# Patient Record
Sex: FEMALE | Race: BLACK OR AFRICAN AMERICAN | NOT HISPANIC OR LATINO | URBAN - METROPOLITAN AREA
[De-identification: names, ages, dates, MRNs, and addresses within clinical notes are randomized per-mention and may not be internally consistent; named-entity substitution may affect disease eponyms.]

---

## 2020-06-12 ENCOUNTER — CLAIMS CREATED FROM THE CLAIM WINDOW (OUTPATIENT)
Dept: URBAN - METROPOLITAN AREA CLINIC 4 | Facility: CLINIC | Age: 60
End: 2020-06-12
Payer: OTHER GOVERNMENT

## 2020-06-12 ENCOUNTER — OFFICE VISIT (OUTPATIENT)
Dept: URBAN - METROPOLITAN AREA SURGERY CENTER 16 | Facility: SURGERY CENTER | Age: 60
End: 2020-06-12
Payer: OTHER GOVERNMENT

## 2020-06-12 DIAGNOSIS — D12.3 ADENOMA OF TRANSVERSE COLON: ICD-10-CM

## 2020-06-12 DIAGNOSIS — D12.6 BENIGN NEOPLASM OF COLON, UNSPECIFIED: ICD-10-CM

## 2020-06-12 DIAGNOSIS — Z12.11 COLON CANCER SCREENING: ICD-10-CM

## 2020-06-12 DIAGNOSIS — D12.4 BENIGN NEOPLASM OF DESCENDING COLON: ICD-10-CM

## 2020-06-12 PROCEDURE — 45380 COLONOSCOPY AND BIOPSY: CPT | Performed by: INTERNAL MEDICINE

## 2020-06-12 PROCEDURE — G8907 PT DOC NO EVENTS ON DISCHARG: HCPCS | Performed by: INTERNAL MEDICINE

## 2020-06-12 PROCEDURE — 88305 TISSUE EXAM BY PATHOLOGIST: CPT | Performed by: PATHOLOGY

## 2020-06-12 PROCEDURE — G9933 CANC DETECTD DURING COL SCRN: HCPCS | Performed by: INTERNAL MEDICINE

## 2021-08-17 ENCOUNTER — OFFICE VISIT (OUTPATIENT)
Dept: URBAN - METROPOLITAN AREA CLINIC 92 | Facility: CLINIC | Age: 61
End: 2021-08-17
Payer: OTHER GOVERNMENT

## 2021-08-17 VITALS
HEIGHT: 61 IN | DIASTOLIC BLOOD PRESSURE: 103 MMHG | WEIGHT: 165 LBS | HEART RATE: 74 BPM | TEMPERATURE: 98 F | BODY MASS INDEX: 31.15 KG/M2 | SYSTOLIC BLOOD PRESSURE: 161 MMHG

## 2021-08-17 DIAGNOSIS — I10 HTN (HYPERTENSION) WITH GOAL TO BE DETERMINED: ICD-10-CM

## 2021-08-17 DIAGNOSIS — R19.5 HEME + STOOL: ICD-10-CM

## 2021-08-17 PROCEDURE — 99213 OFFICE O/P EST LOW 20 MIN: CPT | Performed by: INTERNAL MEDICINE

## 2021-08-17 NOTE — HPI-TODAY'S VISIT:
This is a 61 yo referred by Dr. Asiya Mckeon for heme positive stools.  A copy of this document will be sent to the referring provider. The patient denies BRBPR.  During a physical in February she was found to have heme positive stools on routine physical exam.  She had COVID  around that time which did not require a hospitalization.  During that time she became constipated but never saw blood in her stools.  Her stools went back to normal in May.  The stool test was repeated in April /May and was positive again. Labs were done however the labs are not available at this time.  A colonoscopy 2020 demonstrated a cecal tubular adenoma but was otherwise unremarkable.  She denies abdominal pain and denies NSAID use.

## 2021-08-18 LAB
HEMATOCRIT: 42
HEMOGLOBIN: 13.2
IRON: 42
MCH: 28.5
MCHC: 31.4
MCV: 91
NRBC: (no result)
PLATELETS: 266
RBC: 4.63
RDW: 12.5
WBC: 5.3

## 2021-10-15 ENCOUNTER — OFFICE VISIT (OUTPATIENT)
Dept: URBAN - METROPOLITAN AREA SURGERY CENTER 16 | Facility: SURGERY CENTER | Age: 61
End: 2021-10-15

## 2021-12-14 PROBLEM — 271840007 ABNORMAL FECES: Status: ACTIVE | Noted: 2021-08-17

## 2021-12-21 ENCOUNTER — CLAIMS CREATED FROM THE CLAIM WINDOW (OUTPATIENT)
Dept: URBAN - METROPOLITAN AREA CLINIC 4 | Facility: CLINIC | Age: 61
End: 2021-12-21
Payer: OTHER GOVERNMENT

## 2021-12-21 ENCOUNTER — OFFICE VISIT (OUTPATIENT)
Dept: URBAN - METROPOLITAN AREA SURGERY CENTER 16 | Facility: SURGERY CENTER | Age: 61
End: 2021-12-21
Payer: OTHER GOVERNMENT

## 2021-12-21 DIAGNOSIS — B96.81 HELICOBACTER PYLORI [H. PYLORI] AS THE CAUSE OF DISEASES CLASSIFIED ELSEWHERE: ICD-10-CM

## 2021-12-21 DIAGNOSIS — K31.7 POLYP OF STOMACH AND DUODENUM: ICD-10-CM

## 2021-12-21 DIAGNOSIS — K29.60 OTHER GASTRITIS WITHOUT BLEEDING: ICD-10-CM

## 2021-12-21 DIAGNOSIS — B96.81 BACTERIAL INFECTION DUE TO H. PYLORI: ICD-10-CM

## 2021-12-21 DIAGNOSIS — D13.1 ADENOMATOUS POLYP OF STOMACH: ICD-10-CM

## 2021-12-21 DIAGNOSIS — K29.60 ADENOPAPILLOMATOSIS GASTRICA: ICD-10-CM

## 2021-12-21 PROCEDURE — 88342 IMHCHEM/IMCYTCHM 1ST ANTB: CPT | Performed by: PATHOLOGY

## 2021-12-21 PROCEDURE — 88305 TISSUE EXAM BY PATHOLOGIST: CPT | Performed by: PATHOLOGY

## 2021-12-21 PROCEDURE — G8907 PT DOC NO EVENTS ON DISCHARG: HCPCS | Performed by: INTERNAL MEDICINE

## 2021-12-21 PROCEDURE — 43239 EGD BIOPSY SINGLE/MULTIPLE: CPT | Performed by: INTERNAL MEDICINE

## 2021-12-21 PROCEDURE — 88341 IMHCHEM/IMCYTCHM EA ADD ANTB: CPT | Performed by: PATHOLOGY

## 2021-12-28 ENCOUNTER — TELEPHONE ENCOUNTER (OUTPATIENT)
Dept: URBAN - METROPOLITAN AREA CLINIC 92 | Facility: CLINIC | Age: 61
End: 2021-12-28

## 2021-12-28 RX ORDER — LANSOPRAZOLE, AMOXICILLIN AND CLARITHROMYCIN 30-500-500
AS DIRECTED KIT ORAL BID
Refills: 0 | OUTPATIENT
Start: 2021-12-28

## 2021-12-28 RX ORDER — LANSOPRAZOLE, AMOXICILLIN AND CLARITHROMYCIN 30-500-500
AS DIRECTED KIT ORAL BID
Refills: 0
Start: 2021-12-28

## 2022-01-03 ENCOUNTER — TELEPHONE ENCOUNTER (OUTPATIENT)
Dept: URBAN - METROPOLITAN AREA CLINIC 17 | Facility: CLINIC | Age: 62
End: 2022-01-03

## 2022-01-05 ENCOUNTER — TELEPHONE ENCOUNTER (OUTPATIENT)
Dept: URBAN - METROPOLITAN AREA CLINIC 17 | Facility: CLINIC | Age: 62
End: 2022-01-05

## 2022-01-24 ENCOUNTER — TELEPHONE ENCOUNTER (OUTPATIENT)
Dept: URBAN - METROPOLITAN AREA CLINIC 17 | Facility: CLINIC | Age: 62
End: 2022-01-24

## 2022-01-24 RX ORDER — LANSOPRAZOLE, AMOXICILLIN AND CLARITHROMYCIN 30-500-500
AS DIRECTED KIT ORAL BID
Refills: 0 | Status: ACTIVE | COMMUNITY
Start: 2021-12-28

## 2022-01-24 RX ORDER — FLUCONAZOLE 150 MG/1
1 TABLET TABLET ORAL
Qty: 2 | Refills: 1 | OUTPATIENT
Start: 2022-01-24 | End: 2022-02-07

## 2022-02-15 ENCOUNTER — TELEPHONE ENCOUNTER (OUTPATIENT)
Dept: URBAN - METROPOLITAN AREA CLINIC 74 | Facility: CLINIC | Age: 62
End: 2022-02-15

## 2022-02-18 ENCOUNTER — OFFICE VISIT (OUTPATIENT)
Dept: URBAN - METROPOLITAN AREA MEDICAL CENTER 28 | Facility: MEDICAL CENTER | Age: 62
End: 2022-02-18

## 2023-07-09 PROBLEM — 429047008: Status: ACTIVE | Noted: 2023-07-09

## 2023-07-13 ENCOUNTER — LAB OUTSIDE AN ENCOUNTER (OUTPATIENT)
Dept: URBAN - METROPOLITAN AREA CLINIC 92 | Facility: CLINIC | Age: 63
End: 2023-07-13

## 2023-07-13 ENCOUNTER — WEB ENCOUNTER (OUTPATIENT)
Dept: URBAN - METROPOLITAN AREA CLINIC 92 | Facility: CLINIC | Age: 63
End: 2023-07-13

## 2023-07-13 ENCOUNTER — OFFICE VISIT (OUTPATIENT)
Dept: URBAN - METROPOLITAN AREA CLINIC 92 | Facility: CLINIC | Age: 63
End: 2023-07-13
Payer: OTHER GOVERNMENT

## 2023-07-13 ENCOUNTER — DASHBOARD ENCOUNTERS (OUTPATIENT)
Age: 63
End: 2023-07-13

## 2023-07-13 VITALS
WEIGHT: 161 LBS | DIASTOLIC BLOOD PRESSURE: 97 MMHG | SYSTOLIC BLOOD PRESSURE: 145 MMHG | HEART RATE: 66 BPM | BODY MASS INDEX: 30.4 KG/M2 | TEMPERATURE: 97.1 F | HEIGHT: 61 IN

## 2023-07-13 DIAGNOSIS — Z86.010 HISTORY OF ADENOMATOUS POLYP OF COLON: ICD-10-CM

## 2023-07-13 DIAGNOSIS — D13.1 GASTRIC ADENOMA: ICD-10-CM

## 2023-07-13 DIAGNOSIS — Z86.19 HISTORY OF HELICOBACTER PYLORI INFECTION: ICD-10-CM

## 2023-07-13 DIAGNOSIS — R10.10 UPPER ABDOMINAL PAIN: ICD-10-CM

## 2023-07-13 PROCEDURE — 99213 OFFICE O/P EST LOW 20 MIN: CPT | Performed by: PHYSICIAN ASSISTANT

## 2023-07-13 RX ORDER — LANSOPRAZOLE, AMOXICILLIN AND CLARITHROMYCIN 30-500-500
AS DIRECTED KIT ORAL BID
Refills: 0 | Status: DISCONTINUED | COMMUNITY
Start: 2021-12-28

## 2023-07-13 NOTE — HPI-TODAY'S VISIT:
62yoF last seen in 2021 for heme + stools.  A colonoscopy 2020 demonstrated a cecal tubular adenoma but was otherwise unremarkable.  She denies change in bowel habits, hematochezia or melena. She notes 2m ago an onset of upper abd discomfort, bloating and dyspepsia. Sx have improved since making the apt. Denies GERD sx or dysphagia.  EGD in 2021 showed nodulatiry in antrum ow normal, bx + H pylori with gastric adenoma. HP ABX given and 3m f/u EGD rec but not done and no eradication testing done.

## 2023-07-31 ENCOUNTER — OFFICE VISIT (OUTPATIENT)
Dept: URBAN - METROPOLITAN AREA SURGERY CENTER 16 | Facility: SURGERY CENTER | Age: 63
End: 2023-07-31

## 2023-08-31 ENCOUNTER — OFFICE VISIT (OUTPATIENT)
Dept: URBAN - METROPOLITAN AREA CLINIC 92 | Facility: CLINIC | Age: 63
End: 2023-08-31